# Patient Record
Sex: FEMALE | Race: WHITE | NOT HISPANIC OR LATINO | URBAN - METROPOLITAN AREA
[De-identification: names, ages, dates, MRNs, and addresses within clinical notes are randomized per-mention and may not be internally consistent; named-entity substitution may affect disease eponyms.]

---

## 2020-07-25 ENCOUNTER — TELEPHONE ENCOUNTER (OUTPATIENT)
Dept: URBAN - METROPOLITAN AREA CLINIC 13 | Facility: CLINIC | Age: 50
End: 2020-07-25

## 2020-07-25 RX ORDER — POLYETHYLENE GLYCOL 3350, SODIUM CHLORIDE, SODIUM BICARBONATE AND POTASSIUM CHLORIDE WITH LEMON FLAVOR 420; 11.2; 5.72; 1.48 G/4L; G/4L; G/4L; G/4L
TAKE 1/2 GALLON AT 5:00 PM DAY BEFORE PROCEDURE, TAKE SECOND 1/2 OF GALLON 6 HRS PRIOR TO PROCEDURE POWDER, FOR SOLUTION ORAL
Qty: 1 | Refills: 0 | OUTPATIENT
Start: 2016-04-14 | End: 2016-04-20

## 2020-07-26 ENCOUNTER — TELEPHONE ENCOUNTER (OUTPATIENT)
Dept: URBAN - METROPOLITAN AREA CLINIC 13 | Facility: CLINIC | Age: 50
End: 2020-07-26

## 2020-07-26 RX ORDER — SUMATRIPTAN SUCCINATE 50 MG/1
TAKE 1 TABLET AT ONSET OF MIGRAINE HEADACHE.  MAY REPEAT IN 2 HOURS IF NEEDED TABLET, FILM COATED ORAL
Refills: 0 | Status: ACTIVE | COMMUNITY

## 2020-07-26 RX ORDER — ONABOTULINUMTOXINA 100 [USP'U]/1
EVERY 3 MONTHS INJECTION, POWDER, LYOPHILIZED, FOR SOLUTION INTRADERMAL; INTRAMUSCULAR
Refills: 0 | Status: ACTIVE | COMMUNITY

## 2020-07-26 RX ORDER — SUMATRIPTAN SUCCINATE AND NAPROXEN SODIUM 85; 500 MG/1; MG/1
TAKE 1 TABLET AT ONSET OF HEADACHE. MAY REPEAT ONCE IN 2 HOURS AS NEEDED. MAXIMUM  2 TABLETS IN 24 HOURS TABLET, FILM COATED ORAL
Refills: 0 | Status: ACTIVE | COMMUNITY

## 2020-07-26 RX ORDER — OMEGA-3/DHA/EPA/FISH OIL 300-1000MG
TAKE 1 TABLET DAILY AS DIRECTED CAPSULE ORAL
Refills: 0 | Status: ACTIVE | COMMUNITY

## 2020-07-26 RX ORDER — TOPIRAMATE 100 MG/1
TAKE 1 TABLET DAILY TABLET, FILM COATED ORAL
Refills: 0 | Status: ACTIVE | COMMUNITY

## 2020-07-26 RX ORDER — TIZANIDINE 4 MG/1
TAKE 1 TABLET 3 TIMES DAILY AS NEEDED TABLET ORAL
Refills: 0 | Status: ACTIVE | COMMUNITY

## 2020-07-26 RX ORDER — LOVASTATIN 10 MG/1
TAKE 1 TABLET DAILY AS DIRECTED TABLET ORAL
Refills: 0 | Status: ACTIVE | COMMUNITY

## 2020-07-26 RX ORDER — CITALOPRAM 20 MG/1
TAKE 1 TAB ONCE DAILY TABLET ORAL
Qty: 30 | Refills: 0 | Status: ACTIVE | COMMUNITY

## 2020-07-26 RX ORDER — NORETHINDRONE AND ETHINYL ESTRADIOL 1 MG-35MCG
TAKE 1 TABLET DAILY AS DIRECTED KIT ORAL
Refills: 0 | Status: ACTIVE | COMMUNITY

## 2020-07-26 RX ORDER — MELOXICAM 7.5 MG/1
TAKE 1 TABLET DAILY AS NEEDED TABLET ORAL
Refills: 0 | Status: ACTIVE | COMMUNITY

## 2024-03-20 ENCOUNTER — LAB (OUTPATIENT)
Dept: URBAN - METROPOLITAN AREA CLINIC 113 | Facility: CLINIC | Age: 54
End: 2024-03-20

## 2024-03-20 ENCOUNTER — OV CON (OUTPATIENT)
Dept: URBAN - METROPOLITAN AREA CLINIC 113 | Facility: CLINIC | Age: 54
End: 2024-03-20
Payer: OTHER GOVERNMENT

## 2024-03-20 VITALS
DIASTOLIC BLOOD PRESSURE: 69 MMHG | HEART RATE: 81 BPM | RESPIRATION RATE: 16 BRPM | HEIGHT: 62 IN | WEIGHT: 135 LBS | TEMPERATURE: 97.7 F | SYSTOLIC BLOOD PRESSURE: 134 MMHG | BODY MASS INDEX: 24.84 KG/M2

## 2024-03-20 DIAGNOSIS — K59.09 CHRONIC CONSTIPATION: ICD-10-CM

## 2024-03-20 DIAGNOSIS — Z87.11 HISTORY OF GASTRIC ULCER: ICD-10-CM

## 2024-03-20 DIAGNOSIS — R10.31 ABDOMINAL PAIN, RLQ: ICD-10-CM

## 2024-03-20 DIAGNOSIS — R11.0 NAUSEA: ICD-10-CM

## 2024-03-20 PROBLEM — 422587007: Status: ACTIVE | Noted: 2024-03-20

## 2024-03-20 PROBLEM — 275548000: Status: ACTIVE | Noted: 2024-03-20

## 2024-03-20 PROBLEM — 236069009: Status: ACTIVE | Noted: 2024-03-20

## 2024-03-20 PROBLEM — 301754002: Status: ACTIVE | Noted: 2024-03-20

## 2024-03-20 PROCEDURE — 99204 OFFICE O/P NEW MOD 45 MIN: CPT | Performed by: NURSE PRACTITIONER

## 2024-03-20 RX ORDER — DEXTROAMPHETAMINE SACCHARATE, AMPHETAMINE ASPARTATE, DEXTROAMPHETAMINE SULFATE, AND AMPHETAMINE SULFATE 5; 5; 5; 5 MG/1; MG/1; MG/1; MG/1
1 TABLET TABLET ORAL TWICE A DAY
Status: ACTIVE | COMMUNITY

## 2024-03-20 RX ORDER — LANSOPRAZOLE 30 MG/1
1 CAPSULE BEFORE A MEAL CAPSULE, DELAYED RELEASE ORAL ONCE A DAY
Qty: 30 | Refills: 5 | OUTPATIENT
Start: 2024-03-20

## 2024-03-20 RX ORDER — CITALOPRAM 20 MG/1
TAKE 1 TAB ONCE DAILY TABLET ORAL
Qty: 30 | Refills: 0 | Status: ON HOLD | COMMUNITY

## 2024-03-20 RX ORDER — SUMATRIPTAN SUCCINATE 50 MG/1
TAKE 1 TABLET AT ONSET OF MIGRAINE HEADACHE.  MAY REPEAT IN 2 HOURS IF NEEDED TABLET, FILM COATED ORAL
Refills: 0 | Status: ACTIVE | COMMUNITY

## 2024-03-20 RX ORDER — TIZANIDINE 4 MG/1
TAKE 1 TABLET 3 TIMES DAILY AS NEEDED TABLET ORAL
Refills: 0 | Status: ON HOLD | COMMUNITY

## 2024-03-20 RX ORDER — ONABOTULINUMTOXINA 100 [USP'U]/1
EVERY 3 MONTHS INJECTION, POWDER, LYOPHILIZED, FOR SOLUTION INTRADERMAL; INTRAMUSCULAR
Refills: 0 | Status: ON HOLD | COMMUNITY

## 2024-03-20 RX ORDER — TOPIRAMATE 100 MG/1
TAKE 1 TABLET DAILY TABLET, FILM COATED ORAL
Refills: 0 | Status: ON HOLD | COMMUNITY

## 2024-03-20 RX ORDER — NORETHINDRONE AND ETHINYL ESTRADIOL 0.5-0.035
1 TABLET KIT ORAL ONCE A DAY
Status: ACTIVE | COMMUNITY

## 2024-03-20 RX ORDER — SUMATRIPTAN SUCCINATE AND NAPROXEN SODIUM 85; 500 MG/1; MG/1
TAKE 1 TABLET AT ONSET OF HEADACHE. MAY REPEAT ONCE IN 2 HOURS AS NEEDED. MAXIMUM  2 TABLETS IN 24 HOURS TABLET, FILM COATED ORAL
Refills: 0 | Status: ON HOLD | COMMUNITY

## 2024-03-20 RX ORDER — LOVASTATIN 10 MG/1
TAKE 1 TABLET DAILY AS DIRECTED TABLET ORAL
Refills: 0 | Status: ON HOLD | COMMUNITY

## 2024-03-20 RX ORDER — NORETHINDRONE AND ETHINYL ESTRADIOL 1 MG-35MCG
TAKE 1 TABLET DAILY AS DIRECTED KIT ORAL
Refills: 0 | Status: ON HOLD | COMMUNITY

## 2024-03-20 RX ORDER — ROSUVASTATIN CALCIUM 10 MG/1
1 TABLET TABLET, COATED ORAL ONCE A DAY
Status: ACTIVE | COMMUNITY

## 2024-03-20 RX ORDER — OMEGA-3/DHA/EPA/FISH OIL 300-1000MG
TAKE 1 TABLET DAILY AS DIRECTED CAPSULE ORAL
Refills: 0 | Status: ON HOLD | COMMUNITY

## 2024-03-20 RX ORDER — GALCANEZUMAB 120 MG/ML
AS DIRECTED INJECTION, SOLUTION SUBCUTANEOUS
Status: ACTIVE | COMMUNITY

## 2024-03-20 RX ORDER — MELOXICAM 7.5 MG/1
TAKE 1 TABLET DAILY AS NEEDED TABLET ORAL
Refills: 0 | Status: ON HOLD | COMMUNITY

## 2024-03-20 RX ORDER — BUPROPION HYDROCHLORIDE 100 MG/1
1 TABLET IN THE MORNING TABLET, FILM COATED ORAL ONCE A DAY
Status: ACTIVE | COMMUNITY

## 2024-03-20 RX ORDER — CYCLOBENZAPRINE HYDROCHLORIDE 10 MG/1
1 TABLET AT BEDTIME AS NEEDED TABLET, FILM COATED ORAL ONCE A DAY
Status: ACTIVE | COMMUNITY

## 2024-03-20 NOTE — HPI-OTHER HISTORIES
Abdominal ultrasound 11/6/2023: Numerous hepatic simple cysts are redemonstrated (compared with CT chest 3/2023 and ultrasound 3/2018). Colonoscopy 4/20/2016: Good prep, normal colonoscopy. EGD/13/2016: 2 nonbleeding linear gastric ulcers the largest was 2 mm in dimension. The remainder of the exam was normal. Biopsies demonstrated foveolar hyperplasia consistent with an ulcerative border. There was no H. pylori.

## 2024-03-20 NOTE — PHYSICAL EXAM GASTROINTESTINAL
Abdomen , soft, tender high in RLQ, nondistended , no guarding or rigidity , no masses palpable , normal bowel sounds , Liver and Spleen , no hepatosplenomegaly

## 2024-03-20 NOTE — HPI-TODAY'S VISIT:
This is a a 54-year-old female with a history of hyperlipidemia, abdominal pain, alternating bowel habits, nausea and vomiting, and heartburn referred from his primary care physician for evaluation of nausea, acid reflux, and liver cyst. She was last seen in the office in 2016 for follow-up regarding IBS with diarrhea, functional dyspepsia, and gastric ulcers prior to NSAID use.  She continues to require NSAIDs to manage migraines.  She was on multiple migraine medications including Botox injections.  She was to observe dietary restrictions outlined on the migraine diet and was to increase omeprazole to twice a day for 6 weeks to facilitate mucosal healing.  Chronic symptoms were associated with functional dyspepsia/IBS.  She was to continue dietary restrictions outlined on a low FODMAP diet and continue IBgard twice a day.  She was instructed to minimize NSAID use. She has been using Zegerid for heartburn.  Heartburn occurs infrequently.  For the last year, she has experienced frequent nausea initially relieved with Tums.  Efficacy waned.  She is now taking Zegerid as needed which she requires daily in order to relieve nausea.  She denies vomiting. In the last 2 to 3 weeks, she has experienced discomfort indicated to the right of her umbilicus high in the right lower quadrant.  She reports a pulsation or burning sensation.  This is mild and intermittent.  It is not associated with meals or defecation.  She seems to notice it when she is sitting or lying and less when she is up moving about. She has a history of chronic constipation.  She is currently having a bowel movement once a day.  Her stools are intermittently hard and require straining.  She tried taking Linzess daily for 5 days and reports that her symptoms were improving.  However, due to a disclaimer on the bottle not to take it more than 7 days, she stopped taking it. She has taken omeprazole and pantoprazole in the past.  She has tried them for 6 months with little improvement and discontinued them.  She had labs with her primary care physician in November or December.  Her next labs are due in May.

## 2024-05-13 ENCOUNTER — TELEPHONE ENCOUNTER (OUTPATIENT)
Dept: URBAN - METROPOLITAN AREA CLINIC 113 | Facility: CLINIC | Age: 54
End: 2024-05-13

## 2024-05-14 ENCOUNTER — OUT OF OFFICE VISIT (OUTPATIENT)
Dept: URBAN - METROPOLITAN AREA SURGERY CENTER 25 | Facility: SURGERY CENTER | Age: 54
End: 2024-05-14
Payer: OTHER GOVERNMENT

## 2024-05-14 ENCOUNTER — CLAIMS CREATED FROM THE CLAIM WINDOW (OUTPATIENT)
Dept: URBAN - METROPOLITAN AREA CLINIC 4 | Facility: CLINIC | Age: 54
End: 2024-05-14
Payer: OTHER GOVERNMENT

## 2024-05-14 DIAGNOSIS — K29.70 CHRONIC ACITVE GASTRITIS (H.PYLORI NEGATIVE): ICD-10-CM

## 2024-05-14 DIAGNOSIS — R11.0 AM NAUSEA: ICD-10-CM

## 2024-05-14 DIAGNOSIS — R11.0 NAUSEA: ICD-10-CM

## 2024-05-14 DIAGNOSIS — K29.70 GASTRITIS, UNSPECIFIED, WITHOUT BLEEDING: ICD-10-CM

## 2024-05-14 PROCEDURE — 43239 EGD BIOPSY SINGLE/MULTIPLE: CPT | Performed by: STUDENT IN AN ORGANIZED HEALTH CARE EDUCATION/TRAINING PROGRAM

## 2024-05-14 PROCEDURE — 88312 SPECIAL STAINS GROUP 1: CPT | Performed by: PATHOLOGY

## 2024-05-14 PROCEDURE — 00731 ANES UPR GI NDSC PX NOS: CPT | Performed by: ANESTHESIOLOGY

## 2024-05-14 PROCEDURE — 00731 ANES UPR GI NDSC PX NOS: CPT | Performed by: NURSE ANESTHETIST, CERTIFIED REGISTERED

## 2024-05-14 PROCEDURE — 88305 TISSUE EXAM BY PATHOLOGIST: CPT | Performed by: PATHOLOGY

## 2024-05-14 PROCEDURE — G8907 PT DOC NO EVENTS ON DISCHARG: HCPCS | Performed by: STUDENT IN AN ORGANIZED HEALTH CARE EDUCATION/TRAINING PROGRAM

## 2024-05-14 RX ORDER — SUMATRIPTAN SUCCINATE 50 MG/1
TAKE 1 TABLET AT ONSET OF MIGRAINE HEADACHE.  MAY REPEAT IN 2 HOURS IF NEEDED TABLET, FILM COATED ORAL
Refills: 0 | Status: ACTIVE | COMMUNITY

## 2024-05-14 RX ORDER — CYCLOBENZAPRINE HYDROCHLORIDE 10 MG/1
1 TABLET AT BEDTIME AS NEEDED TABLET, FILM COATED ORAL ONCE A DAY
Status: ACTIVE | COMMUNITY

## 2024-05-14 RX ORDER — GALCANEZUMAB 120 MG/ML
AS DIRECTED INJECTION, SOLUTION SUBCUTANEOUS
Status: ACTIVE | COMMUNITY

## 2024-05-14 RX ORDER — SUMATRIPTAN SUCCINATE AND NAPROXEN SODIUM 85; 500 MG/1; MG/1
TAKE 1 TABLET AT ONSET OF HEADACHE. MAY REPEAT ONCE IN 2 HOURS AS NEEDED. MAXIMUM  2 TABLETS IN 24 HOURS TABLET, FILM COATED ORAL
Refills: 0 | Status: ON HOLD | COMMUNITY

## 2024-05-14 RX ORDER — ROSUVASTATIN CALCIUM 10 MG/1
1 TABLET TABLET, COATED ORAL ONCE A DAY
Status: ACTIVE | COMMUNITY

## 2024-05-14 RX ORDER — TIZANIDINE 4 MG/1
TAKE 1 TABLET 3 TIMES DAILY AS NEEDED TABLET ORAL
Refills: 0 | Status: ON HOLD | COMMUNITY

## 2024-05-14 RX ORDER — ONABOTULINUMTOXINA 100 [USP'U]/1
EVERY 3 MONTHS INJECTION, POWDER, LYOPHILIZED, FOR SOLUTION INTRADERMAL; INTRAMUSCULAR
Refills: 0 | Status: ON HOLD | COMMUNITY

## 2024-05-14 RX ORDER — CITALOPRAM 20 MG/1
TAKE 1 TAB ONCE DAILY TABLET ORAL
Qty: 30 | Refills: 0 | Status: ON HOLD | COMMUNITY

## 2024-05-14 RX ORDER — OMEGA-3/DHA/EPA/FISH OIL 300-1000MG
TAKE 1 TABLET DAILY AS DIRECTED CAPSULE ORAL
Refills: 0 | Status: ON HOLD | COMMUNITY

## 2024-05-14 RX ORDER — MELOXICAM 7.5 MG/1
TAKE 1 TABLET DAILY AS NEEDED TABLET ORAL
Refills: 0 | Status: ON HOLD | COMMUNITY

## 2024-05-14 RX ORDER — LANSOPRAZOLE 30 MG/1
1 CAPSULE BEFORE A MEAL CAPSULE, DELAYED RELEASE ORAL ONCE A DAY
Qty: 30 | Refills: 5 | Status: ACTIVE | COMMUNITY
Start: 2024-03-20

## 2024-05-14 RX ORDER — NORETHINDRONE AND ETHINYL ESTRADIOL 0.5-0.035
1 TABLET KIT ORAL ONCE A DAY
Status: ACTIVE | COMMUNITY

## 2024-05-14 RX ORDER — BUPROPION HYDROCHLORIDE 100 MG/1
1 TABLET IN THE MORNING TABLET, FILM COATED ORAL ONCE A DAY
Status: ACTIVE | COMMUNITY

## 2024-05-14 RX ORDER — NORETHINDRONE AND ETHINYL ESTRADIOL 1 MG-35MCG
TAKE 1 TABLET DAILY AS DIRECTED KIT ORAL
Refills: 0 | Status: ON HOLD | COMMUNITY

## 2024-05-14 RX ORDER — LOVASTATIN 10 MG/1
TAKE 1 TABLET DAILY AS DIRECTED TABLET ORAL
Refills: 0 | Status: ON HOLD | COMMUNITY

## 2024-05-14 RX ORDER — DEXTROAMPHETAMINE SACCHARATE, AMPHETAMINE ASPARTATE, DEXTROAMPHETAMINE SULFATE, AND AMPHETAMINE SULFATE 5; 5; 5; 5 MG/1; MG/1; MG/1; MG/1
1 TABLET TABLET ORAL TWICE A DAY
Status: ACTIVE | COMMUNITY

## 2024-05-14 RX ORDER — TOPIRAMATE 100 MG/1
TAKE 1 TABLET DAILY TABLET, FILM COATED ORAL
Refills: 0 | Status: ON HOLD | COMMUNITY

## 2024-05-26 ENCOUNTER — ERX REFILL RESPONSE (OUTPATIENT)
Dept: URBAN - METROPOLITAN AREA CLINIC 113 | Facility: CLINIC | Age: 54
End: 2024-05-26

## 2024-05-26 RX ORDER — LANSOPRAZOLE 30 MG/1
TAKE 1 CAPSULE BY MOUTH EVERY DAY BEFORE A MEAL CAPSULE, DELAYED RELEASE ORAL
Qty: 90 CAPSULE | Refills: 1 | OUTPATIENT

## 2024-05-26 RX ORDER — LANSOPRAZOLE 30 MG/1
1 CAPSULE BEFORE A MEAL CAPSULE, DELAYED RELEASE ORAL ONCE A DAY
Qty: 30 | Refills: 5 | OUTPATIENT

## 2024-06-26 ENCOUNTER — OFFICE VISIT (OUTPATIENT)
Dept: URBAN - METROPOLITAN AREA CLINIC 113 | Facility: CLINIC | Age: 54
End: 2024-06-26
Payer: OTHER GOVERNMENT

## 2024-06-26 ENCOUNTER — DASHBOARD ENCOUNTERS (OUTPATIENT)
Age: 54
End: 2024-06-26

## 2024-06-26 VITALS — WEIGHT: 139.4 LBS | HEART RATE: 78 BPM | RESPIRATION RATE: 18 BRPM | HEIGHT: 62 IN | BODY MASS INDEX: 25.65 KG/M2

## 2024-06-26 DIAGNOSIS — R11.0 NAUSEA: ICD-10-CM

## 2024-06-26 DIAGNOSIS — R10.31 ABDOMINAL PAIN, RLQ: ICD-10-CM

## 2024-06-26 DIAGNOSIS — K59.09 CHRONIC CONSTIPATION: ICD-10-CM

## 2024-06-26 PROCEDURE — 99213 OFFICE O/P EST LOW 20 MIN: CPT | Performed by: NURSE PRACTITIONER

## 2024-06-26 RX ORDER — NORETHINDRONE AND ETHINYL ESTRADIOL 0.5-0.035
1 TABLET KIT ORAL ONCE A DAY
Status: ON HOLD | COMMUNITY

## 2024-06-26 RX ORDER — ESTRADIOL 1 MG/1
1 TABLET TABLET ORAL ONCE A DAY
Status: ACTIVE | COMMUNITY

## 2024-06-26 RX ORDER — ROSUVASTATIN CALCIUM 10 MG/1
1 TABLET TABLET, COATED ORAL ONCE A DAY
Status: ACTIVE | COMMUNITY

## 2024-06-26 RX ORDER — TOPIRAMATE 100 MG/1
TAKE 1 TABLET DAILY TABLET, FILM COATED ORAL
Refills: 0 | Status: ON HOLD | COMMUNITY

## 2024-06-26 RX ORDER — GALCANEZUMAB 120 MG/ML
AS DIRECTED INJECTION, SOLUTION SUBCUTANEOUS
Status: ACTIVE | COMMUNITY

## 2024-06-26 RX ORDER — CYCLOBENZAPRINE HYDROCHLORIDE 10 MG/1
1 TABLET AT BEDTIME AS NEEDED TABLET, FILM COATED ORAL ONCE A DAY
Status: ACTIVE | COMMUNITY

## 2024-06-26 RX ORDER — TIZANIDINE 4 MG/1
TAKE 1 TABLET 3 TIMES DAILY AS NEEDED TABLET ORAL
Refills: 0 | Status: ON HOLD | COMMUNITY

## 2024-06-26 RX ORDER — SUMATRIPTAN SUCCINATE 50 MG/1
TAKE 1 TABLET AT ONSET OF MIGRAINE HEADACHE.  MAY REPEAT IN 2 HOURS IF NEEDED TABLET, FILM COATED ORAL
Refills: 0 | Status: ACTIVE | COMMUNITY

## 2024-06-26 RX ORDER — OMEGA-3/DHA/EPA/FISH OIL 300-1000MG
TAKE 1 TABLET DAILY AS DIRECTED CAPSULE ORAL
Refills: 0 | Status: ON HOLD | COMMUNITY

## 2024-06-26 RX ORDER — LOVASTATIN 10 MG/1
TAKE 1 TABLET DAILY AS DIRECTED TABLET ORAL
Refills: 0 | Status: ON HOLD | COMMUNITY

## 2024-06-26 RX ORDER — CITALOPRAM 20 MG/1
TAKE 1 TAB ONCE DAILY TABLET ORAL
Qty: 30 | Refills: 0 | Status: ON HOLD | COMMUNITY

## 2024-06-26 RX ORDER — DEXTROAMPHETAMINE SACCHARATE, AMPHETAMINE ASPARTATE, DEXTROAMPHETAMINE SULFATE, AND AMPHETAMINE SULFATE 5; 5; 5; 5 MG/1; MG/1; MG/1; MG/1
1 TABLET TABLET ORAL TWICE A DAY
Status: ACTIVE | COMMUNITY

## 2024-06-26 RX ORDER — BUPROPION HYDROCHLORIDE 100 MG/1
1 TABLET IN THE MORNING TABLET, FILM COATED ORAL ONCE A DAY
Status: ACTIVE | COMMUNITY

## 2024-06-26 RX ORDER — SUMATRIPTAN SUCCINATE AND NAPROXEN SODIUM 85; 500 MG/1; MG/1
TAKE 1 TABLET AT ONSET OF HEADACHE. MAY REPEAT ONCE IN 2 HOURS AS NEEDED. MAXIMUM  2 TABLETS IN 24 HOURS TABLET, FILM COATED ORAL
Refills: 0 | Status: ON HOLD | COMMUNITY

## 2024-06-26 RX ORDER — NORETHINDRONE AND ETHINYL ESTRADIOL 1 MG-35MCG
TAKE 1 TABLET DAILY AS DIRECTED KIT ORAL
Refills: 0 | Status: ON HOLD | COMMUNITY

## 2024-06-26 RX ORDER — ONABOTULINUMTOXINA 100 [USP'U]/1
EVERY 3 MONTHS INJECTION, POWDER, LYOPHILIZED, FOR SOLUTION INTRADERMAL; INTRAMUSCULAR
Refills: 0 | Status: ON HOLD | COMMUNITY

## 2024-06-26 RX ORDER — PROGESTERONE 100 MG/1
1 CAPSULE AT BEDTIME CAPSULE ORAL ONCE A DAY
Status: ACTIVE | COMMUNITY

## 2024-06-26 RX ORDER — LANSOPRAZOLE 30 MG/1
TAKE 1 CAPSULE BY MOUTH EVERY DAY BEFORE A MEAL CAPSULE, DELAYED RELEASE ORAL
Qty: 90 CAPSULE | Refills: 1 | Status: ACTIVE | COMMUNITY

## 2024-06-26 RX ORDER — MELOXICAM 7.5 MG/1
TAKE 1 TABLET DAILY AS NEEDED TABLET ORAL
Refills: 0 | Status: ON HOLD | COMMUNITY

## 2024-06-26 NOTE — HPI-OTHER HISTORIES
EGD 5/14/2024: Normal esophagus, regular Z-line, normal stomach status post biopsy, gastroesophageal flap valve classified as Hill grade 2, normal duodenal bulb and second portion of the duodenum. Pathology: Antral biopsy demonstrated mild chemical/reactive gastropathy without evidence of H. pylori or intestinal metaplasia, dysplasia, malignancy.  CT abdomen and pelvis with contrast 4/4/2024: No acute findings of the abdomen or pelvis. Moderate colonic stool. Nonspecific appendix. Fundal uterine fibroid measuring 4.3 cm. Numerous hepatic cysts largest measuring 3.1 cm.   Abdominal ultrasound 11/6/2023: Numerous hepatic simple cysts are redemonstrated (compared with CT chest 3/2023 and ultrasound 3/2018).  Colonoscopy 4/20/2016: Good prep, normal colonoscopy.  EGD 4/13/2016: 2 nonbleeding linear gastric ulcers the largest was 2 mm in dimension. The remainder of the exam was normal. Biopsies demonstrated foveolar hyperplasia consistent with an ulcerative border. There was no H. pylori.

## 2024-06-26 NOTE — HPI-TODAY'S VISIT:
This is a 54-year-old female with a history of hyperlipidemia, abdominal pain, alternating bowel habits, nausea and vomiting, heartburn, and liver cyst presenting for follow-up.  She was last seen in the office 3/20/2024 complaining of nausea.  She had infrequent heartburn relieved with Zegerid.  In the last year, she had experienced frequent nausea on a daily basis which had also been relieved with Zegerid suggesting an acid peptic disorder.  She had not benefited from omeprazole and pantoprazole in the past.  Labs were requested from her primary care physician.  She was to try lansoprazole 30 mg daily.  She had a history of gastric ulcers likely associated with NSAIDs and not taken NSAIDs in the year.  She was scheduled for EGD.  She had chronic constipation and reported benefit from taking MiraLAX for 5 days.  Daily use was recommended.  She was to titrate to effect.  She had right lower quadrant abdominal pain for 2 or 3 weeks.  She reported this was mild.  She was tender on exam.  CT was ordered.  Colon cancer screening was up-to-date and due in 2026.  EGD 5/14/2024: Normal esophagus, regular Z-line, normal stomach status post biopsy, gastroesophageal flap valve classified as Hill grade 2, normal duodenal bulb and second portion of the duodenum.  Pathology: Antral biopsy demonstrated mild chemical/reactive gastropathy without evidence of H. pylori or intestinal metaplasia, dysplasia, malignancy.  CT abdomen and pelvis with contrast 4/4/2024: No acute findings of the abdomen or pelvis.  Moderate colonic stool.  Nonspecific appendix.  Fundal uterine fibroid measuring 4.3 cm.  Numerous hepatic cysts largest measuring 3.1 cm.  She is taking lansoprazole 30 mg daily.  She is of the opinion this has been beneficial.  There is been less need for Tums and Zegerid on this medication.  She has persistent nausea.  Occasionally, it is associated with migraines and occasionally, it is relieved with taking Zegerid or Tums.  She is requiring these 3-4 times a week and no longer requires daily use.  She has been taking daily MiraLAX.  Constipation is well-controlled.  She continues to report intermittent postprandial bloating.  She denies any other abdominal symptoms.  She is currently pleased with unremarkable testing and her response.

## 2024-12-27 ENCOUNTER — OFFICE VISIT (OUTPATIENT)
Dept: URBAN - METROPOLITAN AREA CLINIC 113 | Facility: CLINIC | Age: 54
End: 2024-12-27
Payer: OTHER GOVERNMENT

## 2024-12-27 VITALS
DIASTOLIC BLOOD PRESSURE: 78 MMHG | WEIGHT: 138 LBS | HEIGHT: 62 IN | TEMPERATURE: 97.5 F | BODY MASS INDEX: 25.4 KG/M2 | SYSTOLIC BLOOD PRESSURE: 107 MMHG | RESPIRATION RATE: 16 BRPM | HEART RATE: 84 BPM

## 2024-12-27 DIAGNOSIS — K59.09 CHRONIC CONSTIPATION: ICD-10-CM

## 2024-12-27 DIAGNOSIS — R11.0 NAUSEA: ICD-10-CM

## 2024-12-27 DIAGNOSIS — K21.9 GERD WITHOUT ESOPHAGITIS: ICD-10-CM

## 2024-12-27 PROCEDURE — 99213 OFFICE O/P EST LOW 20 MIN: CPT | Performed by: NURSE PRACTITIONER

## 2024-12-27 RX ORDER — ROSUVASTATIN CALCIUM 10 MG/1
1 TABLET TABLET, COATED ORAL ONCE A DAY
Status: ACTIVE | COMMUNITY

## 2024-12-27 RX ORDER — LOVASTATIN 10 MG/1
TAKE 1 TABLET DAILY AS DIRECTED TABLET ORAL
Refills: 0 | Status: ON HOLD | COMMUNITY

## 2024-12-27 RX ORDER — DEXTROAMPHETAMINE SACCHARATE, AMPHETAMINE ASPARTATE, DEXTROAMPHETAMINE SULFATE, AND AMPHETAMINE SULFATE 5; 5; 5; 5 MG/1; MG/1; MG/1; MG/1
1 TABLET TABLET ORAL TWICE A DAY
Status: ACTIVE | COMMUNITY

## 2024-12-27 RX ORDER — GALCANEZUMAB 120 MG/ML
AS DIRECTED INJECTION, SOLUTION SUBCUTANEOUS
Status: ACTIVE | COMMUNITY

## 2024-12-27 RX ORDER — CITALOPRAM 20 MG/1
TAKE 1 TAB ONCE DAILY TABLET ORAL
Qty: 30 | Refills: 0 | Status: ON HOLD | COMMUNITY

## 2024-12-27 RX ORDER — LANSOPRAZOLE 30 MG/1
TAKE 1 CAPSULE BY MOUTH EVERY DAY BEFORE A MEAL CAPSULE, DELAYED RELEASE ORAL
Qty: 90 CAPSULE | Refills: 1 | Status: ACTIVE | COMMUNITY

## 2024-12-27 RX ORDER — ESTRADIOL 1 MG/1
1 TABLET TABLET ORAL ONCE A DAY
Status: ON HOLD | COMMUNITY

## 2024-12-27 RX ORDER — BUPROPION HYDROCHLORIDE 100 MG/1
1 TABLET IN THE MORNING TABLET, FILM COATED ORAL ONCE A DAY
Status: ACTIVE | COMMUNITY

## 2024-12-27 RX ORDER — SUMATRIPTAN SUCCINATE 50 MG/1
TAKE 1 TABLET AT ONSET OF MIGRAINE HEADACHE.  MAY REPEAT IN 2 HOURS IF NEEDED TABLET, FILM COATED ORAL
Refills: 0 | Status: ACTIVE | COMMUNITY

## 2024-12-27 RX ORDER — ONABOTULINUMTOXINA 100 [USP'U]/1
EVERY 3 MONTHS INJECTION, POWDER, LYOPHILIZED, FOR SOLUTION INTRADERMAL; INTRAMUSCULAR
Refills: 0 | Status: ON HOLD | COMMUNITY

## 2024-12-27 RX ORDER — MELOXICAM 7.5 MG/1
TAKE 1 TABLET DAILY AS NEEDED TABLET ORAL
Refills: 0 | Status: ON HOLD | COMMUNITY

## 2024-12-27 RX ORDER — PROGESTERONE 100 MG/1
1 CAPSULE AT BEDTIME CAPSULE ORAL ONCE A DAY
Status: ACTIVE | COMMUNITY

## 2024-12-27 RX ORDER — NORETHINDRONE AND ETHINYL ESTRADIOL 1 MG-35MCG
TAKE 1 TABLET DAILY AS DIRECTED KIT ORAL
Refills: 0 | Status: ON HOLD | COMMUNITY

## 2024-12-27 RX ORDER — SUMATRIPTAN SUCCINATE AND NAPROXEN SODIUM 85; 500 MG/1; MG/1
TAKE 1 TABLET AT ONSET OF HEADACHE. MAY REPEAT ONCE IN 2 HOURS AS NEEDED. MAXIMUM  2 TABLETS IN 24 HOURS TABLET, FILM COATED ORAL
Refills: 0 | Status: ON HOLD | COMMUNITY

## 2024-12-27 RX ORDER — NORETHINDRONE AND ETHINYL ESTRADIOL 0.5-0.035
1 TABLET KIT ORAL ONCE A DAY
Status: ON HOLD | COMMUNITY

## 2024-12-27 RX ORDER — OMEGA-3/DHA/EPA/FISH OIL 300-1000MG
TAKE 1 TABLET DAILY AS DIRECTED CAPSULE ORAL
Refills: 0 | Status: ON HOLD | COMMUNITY

## 2024-12-27 RX ORDER — TOPIRAMATE 100 MG/1
TAKE 1 TABLET DAILY TABLET, FILM COATED ORAL
Refills: 0 | Status: ON HOLD | COMMUNITY

## 2024-12-27 RX ORDER — CYCLOBENZAPRINE HYDROCHLORIDE 10 MG/1
1 TABLET AT BEDTIME AS NEEDED TABLET, FILM COATED ORAL ONCE A DAY
Status: ACTIVE | COMMUNITY

## 2024-12-27 RX ORDER — TIZANIDINE 4 MG/1
TAKE 1 TABLET 3 TIMES DAILY AS NEEDED TABLET ORAL
Refills: 0 | Status: ON HOLD | COMMUNITY

## 2024-12-27 NOTE — HPI-TODAY'S VISIT:
This is a 54-year-old female with a history of hyperlipidemia, abdominal pain, alternating bowel habits, nausea vomiting, heartburn, and liver cyst presenting for follow-up.  She was last seen 6/26/2024 regarding chronic constipation, and nausea.  She reported improvement in nausea on lansoprazole suggesting contribution from an acid peptic disorder.  Was also discussed there may be a component of functional dyspepsia given unremarkable EGD.  Migraines were also likely playing a role.  She was to continue lansoprazole 30 mg daily and continue Zegerid or Tums as needed.  Constipation was controlled with daily MiraLAX.  She had previously experienced right lower quadrant abdominal pain that had resolved.  It was discussed that may have been associated with suboptimally treated constipation.  Prior CT was notable for moderate colonic stool and no acute findings.  She has been taking lansoprazole 30 mg daily.  She reports her symptoms have improved.  She has breakthrough only if she forgets to take her medication.  She has not required Tums or Zegerid.  Nausea has likewise improved on lansoprazole.  She is taking MiraLAX daily for constipation.  She is having a daily bowel movement.  She will not have a bowel movement if she forgets a dose.

## 2024-12-29 PROBLEM — 266435005: Status: ACTIVE | Noted: 2024-12-29

## 2025-02-09 ENCOUNTER — ERX REFILL RESPONSE (OUTPATIENT)
Dept: URBAN - METROPOLITAN AREA CLINIC 113 | Facility: CLINIC | Age: 55
End: 2025-02-09

## 2025-02-09 RX ORDER — LANSOPRAZOLE 30 MG/1
TAKE 1 CAPSULE BY MOUTH EVERY DAY BEFORE A MEAL CAPSULE, DELAYED RELEASE ORAL
Qty: 90 CAPSULE | Refills: 3 | OUTPATIENT

## 2025-02-09 RX ORDER — LANSOPRAZOLE 30 MG/1
TAKE 1 CAPSULE BY MOUTH EVERY DAY BEFORE A MEAL CAPSULE, DELAYED RELEASE ORAL
Qty: 90 CAPSULE | Refills: 1 | OUTPATIENT